# Patient Record
(demographics unavailable — no encounter records)

---

## 2025-02-19 NOTE — ASSESSMENT
[FreeTextEntry1] :  HypoT / Hashimoto's  Tpo ab positive  cont lt4 50 mcg qd and take it daily and repeat tfst in 5-6 weeks after being compliant  Take daily in AM on an empty stomach at least 30 minutes before eating or taking other medication.  Vitamin d defic: cont ergo weekly.   check labs

## 2025-02-19 NOTE — HISTORY OF PRESENT ILLNESS
[FreeTextEntry1] : quality: hypoT  onset 2023 severity: moderate modifying factors: lt4 helped.   associated factors: none

## 2025-05-21 NOTE — HISTORY OF PRESENT ILLNESS
Detail Level: Simple Detail Level: Detailed [FreeTextEntry1] :  hypoT  onset 2023  Detail Level: Zone

## 2025-05-21 NOTE — ASSESSMENT
[FreeTextEntry1] :  HypoT / Hashimoto's  Tpo ab positive  cont lt4 50 mcg qd and repeat tfts in 6 mos.   Take daily in AM on an empty stomach at least 30 minutes before eating or taking other medication.  Vitamin d defic: cont ergo weekly.   All lab results reviewed independently and discussed with patient with extensive discussion.  All questions answered Laboratory tests ordered today Continue other current medications